# Patient Record
Sex: FEMALE | Race: WHITE | NOT HISPANIC OR LATINO | Employment: PART TIME | URBAN - METROPOLITAN AREA
[De-identification: names, ages, dates, MRNs, and addresses within clinical notes are randomized per-mention and may not be internally consistent; named-entity substitution may affect disease eponyms.]

---

## 2017-10-02 ENCOUNTER — HOSPITAL ENCOUNTER (EMERGENCY)
Facility: HOSPITAL | Age: 62
Discharge: HOME | End: 2017-10-02
Attending: EMERGENCY MEDICINE | Admitting: EMERGENCY MEDICINE
Payer: COMMERCIAL

## 2017-10-02 VITALS
SYSTOLIC BLOOD PRESSURE: 132 MMHG | OXYGEN SATURATION: 98 % | TEMPERATURE: 98.4 F | DIASTOLIC BLOOD PRESSURE: 77 MMHG | WEIGHT: 135.13 LBS | HEART RATE: 80 BPM

## 2017-10-02 DIAGNOSIS — S61.451A CAT BITE OF RIGHT HAND, INITIAL ENCOUNTER: Primary | ICD-10-CM

## 2017-10-02 DIAGNOSIS — Z23 RABIES, NEED FOR PROPHYLACTIC VACCINATION AGAINST: ICD-10-CM
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
## 2017-10-02 PROCEDURE — 90471 IMMUNIZATION ADMIN: CPT

## 2017-10-02 PROCEDURE — 96372 THER/PROPH/DIAG INJ SC/IM: CPT

## 2017-10-02 PROCEDURE — 90376 RABIES IG HEAT TREATED: CPT | Performed by: EMERGENCY MEDICINE

## 2017-10-02 PROCEDURE — 99283 EMERGENCY DEPT VISIT LOW MDM: CPT

## 2017-10-02 PROCEDURE — 90675 RABIES VACCINE IM: CPT | Performed by: EMERGENCY MEDICINE

## 2017-10-02 RX ORDER — DOXEPIN HYDROCHLORIDE 10 MG/1
CAPSULE ORAL
COMMUNITY
Start: 2017-03-22

## 2017-10-02 RX ORDER — CALCITRIOL 3 UG/G
OINTMENT TOPICAL
COMMUNITY
Start: 2017-03-27

## 2017-10-02 RX ORDER — ESTRADIOL 0.1 MG/G
CREAM VAGINAL
COMMUNITY
Start: 2017-04-03

## 2017-10-02 RX ORDER — BUSPIRONE HYDROCHLORIDE 5 MG/1
TABLET ORAL
COMMUNITY
Start: 2017-04-03

## 2017-10-02 RX ORDER — LATANOPROST 50 UG/ML
SOLUTION/ DROPS OPHTHALMIC
COMMUNITY
Start: 2017-03-21

## 2017-10-02 RX ORDER — ATORVASTATIN CALCIUM 20 MG/1
20 TABLET, FILM COATED ORAL
COMMUNITY
Start: 2017-04-10

## 2017-10-02 RX ORDER — THIOTHIXENE 1 MG/1
1 CAPSULE ORAL 3 TIMES DAILY
COMMUNITY

## 2017-10-02 RX ORDER — AMOXICILLIN AND CLAVULANATE POTASSIUM 875; 125 MG/1; MG/1
1 TABLET, FILM COATED ORAL
COMMUNITY
Start: 2017-10-02 | End: 2017-10-09

## 2017-10-02 RX ORDER — METOPROLOL TARTRATE 50 MG/1
50 TABLET, FILM COATED ORAL
COMMUNITY
Start: 2017-10-02

## 2017-10-02 RX ADMIN — HUMAN RABIES VIRUS IMMUNE GLOBULIN 1230 UNITS: 150 INJECTION, SOLUTION INTRAMUSCULAR at 21:27

## 2017-10-02 RX ADMIN — RABIES VACCINE 1 ML: KIT at 21:12

## 2017-10-03 NOTE — ED NOTES
Injected 300units of the RIG into the wound, pt tolerated well     Martell Mejia RN  10/02/17 7527

## 2017-10-03 NOTE — DISCHARGE INSTRUCTIONS
Animal Bite   WHAT YOU NEED TO KNOW:   Animal bite injuries range from shallow cuts to deep, life-threatening wounds  An animal can cut or puncture the skin when it bites  Your skin may be torn from your body  Your skin may swell or bruise even if the bite does not break the skin  Animal bites occur more often on the hands, arms, legs, and face  Bites from dogs and cats are the most common injuries  DISCHARGE INSTRUCTIONS:   Return to the emergency department if:   · You have a fever  · Your wound is red, swollen, and draining pus  · You see red streaks on the skin around the wound  · You can no longer move the bitten area  · Your heartbeat and breathing are much faster than usual     · You feel dizzy and confused  Contact your healthcare provider if:   · Your pain does not get better, even after you take pain medicine  · You have nightmares or flashbacks about the animal bite  · You have questions or concerns about your condition or care  Medicines: You may need any of the following:  · Antibiotics  prevent or treat a bacterial infection  · Prescription pain medicine  may be given  Ask how to take this medicine safely  · A tetanus vaccine  may be needed to prevent tetanus  Tetanus is a life-threatening bacterial infection that affects the nerves and muscles  The bacteria can be spread through animal bites  · A rabies vaccine  may be needed to prevent rabies  Rabies is a life-threatening viral infection  The virus can be spread through animal bites  · Take your medicine as directed  Contact your healthcare provider if you think your medicine is not helping or if you have side effects  Tell him of her if you are allergic to any medicine  Keep a list of the medicines, vitamins, and herbs you take  Include the amounts, and when and why you take them  Bring the list or the pill bottles to follow-up visits  Carry your medicine list with you in case of an emergency    Follow up with your healthcare provider in 1 to 2 days: You may need to return to have your stitches removed  Write down your questions so you remember to ask them during your visits  Self-care:   · Apply antibiotic ointment as directed  This helps prevent infection in minor skin wounds  It is available without a doctor's order  · Keep the wound clean and covered  Wash the wound every day with soap and water or germ-killing cleanser  Ask your healthcare provider about the kinds of bandages to use  · Apply ice on your wound  Ice helps decrease swelling and pain  Ice may also help prevent tissue damage  Use an ice pack, or put crushed ice in a plastic bag  Cover it with a towel and place it on your wound for 15 to 20 minutes every hour or as directed  · Elevate the wound area  Raise your wound above the level of your heart as often as you can  This will help decrease swelling and pain  Prop your wound on pillows or blankets to keep it elevated comfortably  Prevent another animal bite:   · Learn to recognize the signs of a scared or angry pet  Avoid quick, sudden movements  · Do not step between animals that are fighting  · Do not leave a pet alone with a young child  Do not disturb an animal while it eats, sleeps, or cares for its young  · Do not approach an animal you do not know, especially one that is tied up or caged  · Stay away from animals that seem sick or act strangely  · Do not feed or capture wild animals  © 2017 [employer] Information is for End User's use only and may not be sold, redistributed or otherwise used for commercial purposes  All illustrations and images included in CareNotes® are the copyrighted property of [employer] [id number] , [employer]  or [employer]  The above information is an  only  It is not intended as medical advice for individual conditions or treatments   Talk to your doctor, nurse or pharmacist before following any medical regimen to see if it is safe and effective for you

## 2017-10-03 NOTE — ED PROVIDER NOTES
History  Chief Complaint   Patient presents with   [facility]     pt states that yesterday she was bitten by a cat yesterday and would like to be treated for rabbies      63 yow[place]itten by a cat there - she is not sure if it is a stray cat or belongs to someone on the farm  Bitten on right hand  Cat didn't seem sick  Saw PMD today and started on Augmentin  Her tetanus is up to date  She wants the rabies series  Prior to Admission Medications   Prescriptions Last Dose Informant Patient Reported? Taking? Calcitriol 3 MCG/GM cream   Yes Yes   amoxicillin-clavulanate (AUGMENTIN) 875-125 mg per tablet   Yes Yes   Sig: Take 1 tablet by mouth   atorvastatin (LIPITOR) 20 mg tablet   Yes Yes   Sig: Take 20 mg by mouth   busPIRone (BUSPAR) 5 mg tablet   Yes Yes   Sig: take 1 tablet by mouth every morning then 2 tablets at noon and 1 tablet AT NIGHT   doxepin (SINEquan) 10 mg capsule   Yes Yes   estradiol (ESTRACE VAGINAL) 0 1 mg/g vaginal cream   Yes Yes   latanoprost (XALATAN) 0 005 % ophthalmic solution   Yes Yes   metoprolol tartrate (LOPRESSOR) 50 mg tablet   Yes Yes   Sig: Take 50 mg by mouth   thiothixene (NAVANE) 1 mg capsule   Yes Yes   Sig: Take 1 mg by mouth 3 (three) times a day      Facility-Administered Medications: None       Past Medical History:   Diagnosis Date    Hyperlipidemia        No past surgical history on file  No family history on file  I have reviewed and agree with the history as documented      Social History   Substance Use Topics    Smoking status: Not on file    Smokeless tobacco: Not on file    Alcohol use Not on file        Review of Systems    Physical Exam  ED Triage Vitals [10/02/17 1935]   Temperature Pulse Resp Blood Pressure SpO2   98 4 °F (36 9 °C) 80 -- 132/77 98 %      Temp src Heart Rate Source Patient Position - Orthostatic VS BP Location FiO2 (%)   -- -- -- -- --      Pain Score       --           Physical Exam Constitutional: She appears well-developed and well-nourished  No distress  Neck: Neck supple  Pulmonary/Chest: Effort normal    Musculoskeletal:   Right palm + tiny puncture wound base of thumb, no FB palp, + mild contusion  Opposite wound on dorsum  M/s grossly intact, not red or warm   Skin: She is not diaphoretic  Nursing note and vitals reviewed  ED Medications  Medications   rabies immune globulin, human (IMOGAM RABIES-HT) IM injection 1,230 Units (not administered)   rabies vaccine, chick embryo (RABAVERT) IM injection 1 mL (1 mL Intramuscular Given 10/2/17 2112)       Diagnostic Studies  Labs Reviewed - No data to display    No orders to display       Procedures  Procedures      Phone Contacts  ED Phone Contact    ED Course  ED Course                                MDM  Number of Diagnoses or Management Options  Cat bite of right hand, initial encounter:   Rabies, need for prophylactic vaccination against:   Diagnosis management comments: Will initiate rabies series  1/3 the RIG injected around bite wound - sl  Less than half due to volume  CritCare Time    Disposition  Final diagnoses:   Cat bite of right hand, initial encounter   Rabies, need for prophylactic vaccination against     ED Disposition     ED Disposition Condition Comment    Discharge  Logan Mckay discharge to home/self care  Condition at discharge: Stable        Follow-up Information     Follow up With Specialties Details Why Contact Info    Bella Cronin MD   As needed [address] [facility]  [address] [address] [phone]          Patient's Medications   Discharge Prescriptions    No medications on file     No discharge procedures on file      ED Provider  Electronically Signed by       Elzbieta Espinal MD  36/84/02 8782       Elzbieta Espinal MD  25/58/72 7216

## 2017-12-27 ENCOUNTER — NEW REFERRAL (OUTPATIENT)
Dept: URBAN - METROPOLITAN AREA CLINIC 14 | Facility: CLINIC | Age: 62
End: 2017-12-27

## 2017-12-27 DIAGNOSIS — Q14.1: ICD-10-CM

## 2017-12-27 DIAGNOSIS — H43.812: ICD-10-CM

## 2017-12-27 DIAGNOSIS — H25.13: ICD-10-CM

## 2017-12-27 PROCEDURE — 92225 OPHTHALMOSCOPY (INITIAL): CPT

## 2017-12-27 PROCEDURE — 99244 OFF/OP CNSLTJ NEW/EST MOD 40: CPT

## 2017-12-27 PROCEDURE — 92134 CPTRZ OPH DX IMG PST SGM RTA: CPT

## 2017-12-27 PROCEDURE — 1036F TOBACCO NON-USER: CPT

## 2017-12-27 PROCEDURE — G8427 DOCREV CUR MEDS BY ELIG CLIN: HCPCS

## 2017-12-27 ASSESSMENT — TONOMETRY
OS_IOP_MMHG: 14
OD_IOP_MMHG: 17

## 2017-12-27 ASSESSMENT — VISUAL ACUITY
OD_CC: 20/20
OS_CC: 20/20-3

## 2018-01-13 NOTE — RESULT NOTES
Verified Results  (1) RUBELLA IMMUNITY [id number] 09:07AM Jamaal Lust     Test Name Result Flag Reference   RUBELLA (IGG) >175 0 IU/mL  >9 9   Rubella IgG       <5 0 IU/mL     Negative: not immune      5 0-9 9 IU/mL  Equivocal     >9 9 IU/mL     Positive: immune     (1) HEP BE ANTIGEN [id number] 09:07AM Jamaal Lust     Test Name Result Flag Reference   HEP BE ANTIGEN Negative  Negative   Performed at:  [address] [place] [address]  [id number]  : Sara Segura MD, Phone:  [phone]     (1) RUBEOLA ANTIBODIES, IGG [id number] 09:07AM Jamaal Lust     Test Name Result Flag Reference   Rubeola AB, IgG IMMUNE  IMMUNE   Interpretation:    IMMUNE     - Presumed immune to Measles infection  EQUIVOCAL  - Suggest repeat in 2-4 weeks  NON-IMMUNE - Presumed non-immune to Measles infection  (1) MUMPS  ANTIBODIES, IGG [id number] 09:07AM Jamaal Lust     Test Name Result Flag Reference   MUMPS IgG IMMUNE  IMMUNE   IMMUNE - Presumed immune to mumps infection  INTERPRETATION:    IMMUNE     - Presumed immune to Mumps IgG infection  EQUIVOCAL  - Suggest repeat testing in 2-4 weeks  NON-IMMUNE - Presumed non-immune to Mumps IgG infection  (1) VARICELLA ZOSTER IMMUNITY(IGG) [id number] 09:07AM Jamaal Lust     Test Name Result Flag Reference   ROSA ZOSTER IGG IMMUNE  IMMUNE   INTERPRETATION:    IMMUNE     - Presumed immune to VZV IgG infection  EQUIVOCAL  - Suggest repeat testing in 2-4 weeks  NON-IMMUNE - Presumed non-immune to VZV IgG infection

## 2018-01-24 ENCOUNTER — FOLLOW UP (OUTPATIENT)
Dept: URBAN - METROPOLITAN AREA CLINIC 14 | Facility: CLINIC | Age: 63
End: 2018-01-24

## 2018-01-24 DIAGNOSIS — Q14.1: ICD-10-CM

## 2018-01-24 DIAGNOSIS — H43.812: ICD-10-CM

## 2018-01-24 PROCEDURE — 1036F TOBACCO NON-USER: CPT

## 2018-01-24 PROCEDURE — 92226 OPHTHALMOSCOPY (SUB): CPT

## 2018-01-24 PROCEDURE — 92134 CPTRZ OPH DX IMG PST SGM RTA: CPT

## 2018-01-24 PROCEDURE — G8427 DOCREV CUR MEDS BY ELIG CLIN: HCPCS

## 2018-01-24 PROCEDURE — 92014 COMPRE OPH EXAM EST PT 1/>: CPT

## 2018-01-24 PROCEDURE — 92250 FUNDUS PHOTOGRAPHY W/I&R: CPT

## 2018-01-24 ASSESSMENT — VISUAL ACUITY
OD_PH: 20/20+4
OS_PH: 20/20+3
OS_CC: 20/25+1
OD_CC: 20/20

## 2018-01-24 ASSESSMENT — TONOMETRY
OD_IOP_MMHG: 16
OS_IOP_MMHG: 15

## 2025-02-12 ENCOUNTER — CONSULT EP (OUTPATIENT)
Age: 70
End: 2025-02-12

## 2025-02-12 DIAGNOSIS — H43.391: ICD-10-CM

## 2025-02-12 DIAGNOSIS — H43.812: ICD-10-CM

## 2025-02-12 PROCEDURE — 99204 OFFICE O/P NEW MOD 45 MIN: CPT

## 2025-02-12 PROCEDURE — 92201 OPSCPY EXTND RTA DRAW UNI/BI: CPT

## 2025-02-12 PROCEDURE — 92134 CPTRZ OPH DX IMG PST SGM RTA: CPT | Mod: NC

## 2025-02-12 ASSESSMENT — VISUAL ACUITY
OS_CC: 20/30-3
OS_PH: 20/25+1
OD_CC: 20/25-2

## 2025-02-12 ASSESSMENT — TONOMETRY
OS_IOP_MMHG: 18
OD_IOP_MMHG: 21